# Patient Record
Sex: FEMALE
[De-identification: names, ages, dates, MRNs, and addresses within clinical notes are randomized per-mention and may not be internally consistent; named-entity substitution may affect disease eponyms.]

---

## 2023-05-28 ENCOUNTER — APPOINTMENT (OUTPATIENT)
Dept: AFTER HOURS CARE | Facility: EMERGENCY ROOM | Age: 33
End: 2023-05-28

## 2023-05-29 ENCOUNTER — APPOINTMENT (OUTPATIENT)
Dept: AFTER HOURS CARE | Facility: EMERGENCY ROOM | Age: 33
End: 2023-05-29

## 2023-05-29 ENCOUNTER — APPOINTMENT (OUTPATIENT)
Dept: AFTER HOURS CARE | Facility: EMERGENCY ROOM | Age: 33
End: 2023-05-29
Payer: COMMERCIAL

## 2023-05-29 DIAGNOSIS — M54.2 CERVICALGIA: ICD-10-CM

## 2023-05-29 PROBLEM — Z00.00 ENCOUNTER FOR PREVENTIVE HEALTH EXAMINATION: Status: ACTIVE | Noted: 2023-05-29

## 2023-05-29 PROCEDURE — 99204 OFFICE O/P NEW MOD 45 MIN: CPT | Mod: 95

## 2023-05-29 RX ORDER — CYCLOBENZAPRINE HYDROCHLORIDE 5 MG/1
5 TABLET, FILM COATED ORAL
Qty: 3 | Refills: 0 | Status: ACTIVE | COMMUNITY
Start: 2023-05-29 | End: 1900-01-01

## 2023-05-29 NOTE — REVIEW OF SYSTEMS
[Fever] : no fever [Chills] : no chills [Vision Problems] : no vision problems [Hearing Loss] : no hearing loss [Chest Pain] : no chest pain [Shortness Of Breath] : no shortness of breath [Nausea] : no nausea [Vomiting] : no vomiting [Skin Rash] : no skin rash [Headache] : no headache [Dizziness] : no dizziness [Fainting] : no fainting [FreeTextEntry4] : no tinnitus  [FreeTextEntry9] : +neck pain

## 2023-05-29 NOTE — PHYSICAL EXAM
[No Acute Distress] : no acute distress [Well-Appearing] : well-appearing [Normal Voice/Communication] : normal voice/communication [Normal Sclera/Conjunctiva] : normal sclera/conjunctiva [EOMI] : extraocular movements intact [Normal Outer Ear/Nose] : the outer ears and nose were normal in appearance [No Lymphadenopathy] : no lymphadenopathy [No Respiratory Distress] : no respiratory distress  [No Rash] : no rash [No Focal Deficits] : no focal deficits [Alert and Oriented x3] : oriented to person, place, and time [de-identified] : FROM without difficulty

## 2023-05-29 NOTE — ASSESSMENT
[FreeTextEntry1] : 32y F w/ no PMHx presenting with B/L neck pain x2 days. FROM of neck without pain or difficulty, well-appearing female, able to ambulate independently without dizziness, or headache. No noted cervical lymphadenopathy, no rush, no masses. Symptoms of likely MSK etiology. Will send a prescription of Flexeril to pharmacy, patient also advised to use hot packs and Motrin for pain control at home. Patient educated on supporting her neck while sleeping at night no strenuous activity until symptoms have resolved and follow up with primary care doctor.\par

## 2023-05-29 NOTE — HISTORY OF PRESENT ILLNESS
[Home] : at home, [unfilled] , at the time of the visit. [Other Location: e.g. Home (Enter Location, City,State)___] : at [unfilled] [Verbal consent obtained from patient] : the patient, [unfilled] [FreeTextEntry8] : 32y F w/ no PMHx presenting with B/L neck pain x2 days. Patient states she just recovered from the flu one week ago and noted she was primarily lying in bed or on the couch. State she started to have neck soreness and pain that grows in the past two days. States pain is a dull ache, worsened with certain positions or movements. Denies associated headache, dizziness, tinnitus, visual changes, rash, fever. Patient has only taken Tylenol for her pain which intermittently helps. Denies recent trauma.

## 2023-05-29 NOTE — PLAN
[With new medications prescribed] : Treat in place: with new medications prescribed [FreeTextEntry1] : -Rx of Flexeril to pharmacy\par -Motrin 400-600mg q6h w/ food as needed\par -Hot packs\par -Anticipatory guidance discussed with patient along with follow up instructions to primary care doctor and strict ED precautions

## 2023-09-13 ENCOUNTER — TRANSCRIPTION ENCOUNTER (OUTPATIENT)
Age: 33
End: 2023-09-13